# Patient Record
Sex: MALE | Race: OTHER | HISPANIC OR LATINO
[De-identification: names, ages, dates, MRNs, and addresses within clinical notes are randomized per-mention and may not be internally consistent; named-entity substitution may affect disease eponyms.]

---

## 2020-02-28 PROBLEM — Z00.00 ENCOUNTER FOR PREVENTIVE HEALTH EXAMINATION: Status: ACTIVE | Noted: 2020-02-28

## 2020-03-04 ENCOUNTER — APPOINTMENT (OUTPATIENT)
Dept: GASTROENTEROLOGY | Facility: CLINIC | Age: 55
End: 2020-03-04
Payer: COMMERCIAL

## 2020-03-04 VITALS
DIASTOLIC BLOOD PRESSURE: 80 MMHG | OXYGEN SATURATION: 98 % | BODY MASS INDEX: 29.45 KG/M2 | HEART RATE: 49 BPM | SYSTOLIC BLOOD PRESSURE: 110 MMHG | WEIGHT: 150 LBS | HEIGHT: 60 IN

## 2020-03-04 DIAGNOSIS — Z12.11 ENCOUNTER FOR SCREENING FOR MALIGNANT NEOPLASM OF COLON: ICD-10-CM

## 2020-03-04 PROCEDURE — S0285 CNSLT BEFORE SCREEN COLONOSC: CPT

## 2020-03-04 NOTE — CONSULT LETTER
[Dear  ___] : Dear  [unfilled], [Consult Letter:] : I had the pleasure of evaluating your patient, [unfilled]. [Please see my note below.] : Please see my note below. [Consult Closing:] : Thank you very much for allowing me to participate in the care of this patient.  If you have any questions, please do not hesitate to contact me. [Sincerely,] : Sincerely, [FreeTextEntry3] : Luca Sorensen MD\par tel: [phone]\par fax: [phone]\par

## 2020-03-04 NOTE — HISTORY OF PRESENT ILLNESS
[de-identified] : ESA NDIAYE  is being evaluated at the request of Dr. Lillie Guerrero for an opinion re: colon cancer screening. enies nausea, vomiting, fever, chills, diarrhea, constipation, melena, hematemesis, BRBPR, reflux\par

## 2020-03-04 NOTE — PHYSICAL EXAM
[General Appearance - In No Acute Distress] : in no acute distress [General Appearance - Alert] : alert [Sclera] : the sclera and conjunctiva were normal [Outer Ear] : the ears and nose were normal in appearance [Neck Appearance] : the appearance of the neck was normal [Abdomen Soft] : soft [] : no respiratory distress [Abnormal Walk] : normal gait [FreeTextEntry1] : deferred [No Focal Deficits] : no focal deficits [Skin Color & Pigmentation] : normal skin color and pigmentation [Oriented To Time, Place, And Person] : oriented to person, place, and time

## 2024-05-30 ENCOUNTER — APPOINTMENT (OUTPATIENT)
Dept: CARDIOLOGY | Facility: CLINIC | Age: 59
End: 2024-05-30
Payer: COMMERCIAL

## 2024-05-30 ENCOUNTER — NON-APPOINTMENT (OUTPATIENT)
Age: 59
End: 2024-05-30

## 2024-05-30 VITALS
BODY MASS INDEX: 31.22 KG/M2 | OXYGEN SATURATION: 97 % | HEIGHT: 60 IN | SYSTOLIC BLOOD PRESSURE: 123 MMHG | RESPIRATION RATE: 14 BRPM | HEART RATE: 54 BPM | WEIGHT: 159 LBS | DIASTOLIC BLOOD PRESSURE: 70 MMHG

## 2024-05-30 DIAGNOSIS — E78.1 PURE HYPERGLYCERIDEMIA: ICD-10-CM

## 2024-05-30 DIAGNOSIS — G56.20 LESION OF ULNAR NERVE, UNSPECIFIED UPPER LIMB: ICD-10-CM

## 2024-05-30 DIAGNOSIS — Z86.19 PERSONAL HISTORY OF OTHER INFECTIOUS AND PARASITIC DISEASES: ICD-10-CM

## 2024-05-30 DIAGNOSIS — U07.1 COVID-19: ICD-10-CM

## 2024-05-30 DIAGNOSIS — R00.1 BRADYCARDIA, UNSPECIFIED: ICD-10-CM

## 2024-05-30 DIAGNOSIS — Z87.898 PERSONAL HISTORY OF OTHER SPECIFIED CONDITIONS: ICD-10-CM

## 2024-05-30 PROCEDURE — 99202 OFFICE O/P NEW SF 15 MIN: CPT

## 2024-05-30 PROCEDURE — 93000 ELECTROCARDIOGRAM COMPLETE: CPT

## 2024-06-01 PROBLEM — G56.20 ULNAR NERVE NEUROPATHY: Status: RESOLVED | Noted: 2024-06-01 | Resolved: 2024-06-01

## 2024-06-01 PROBLEM — E78.1 HYPERTRIGLYCERIDEMIA: Status: RESOLVED | Noted: 2024-06-01 | Resolved: 2024-06-01

## 2024-06-01 PROBLEM — U07.1 COVID-19 VIRUS INFECTION: Status: RESOLVED | Noted: 2024-06-01 | Resolved: 2024-06-01

## 2024-06-01 PROBLEM — Z87.898 HISTORY OF PREDIABETES: Status: RESOLVED | Noted: 2024-06-01 | Resolved: 2024-06-01

## 2024-06-01 PROBLEM — Z86.19 HISTORY OF ONYCHOMYCOSIS: Status: RESOLVED | Noted: 2024-06-01 | Resolved: 2024-06-01

## 2024-06-01 NOTE — ASSESSMENT
[FreeTextEntry1] : 59 yo male with asymptomatic sinus bradycardia with HR in 50s. ECG today demonstrated sinus bradycardia with HR 53 bpm.  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 He denies any exertional complaints or lightheadedness/syncope. Free T4 from 5/3/24 labs was reviewed today and found to be normal. No further cardiac testing is indicated at this time. Should he develop chest pain, SOB, or lightheadedness/syncope, he should return for further evaluation.

## 2024-06-01 NOTE — HISTORY OF PRESENT ILLNESS
[FreeTextEntry1] : 59 yo male who was referred by his PCP for evaluation of sinus bradycardia with HR 50s. Patient denies chest pain, dyspnea, palpitations, syncope, edema, melena, hematochezia, or hematemesis. 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  Primary: Vinnie Atkinson ([facility], fax: [phone])